# Patient Record
Sex: FEMALE | Employment: FULL TIME | URBAN - METROPOLITAN AREA
[De-identification: names, ages, dates, MRNs, and addresses within clinical notes are randomized per-mention and may not be internally consistent; named-entity substitution may affect disease eponyms.]

---

## 2021-02-11 ENCOUNTER — TRANSFERRED RECORDS (OUTPATIENT)
Dept: HEALTH INFORMATION MANAGEMENT | Facility: CLINIC | Age: 26
End: 2021-02-11

## 2021-03-10 ENCOUNTER — MEDICAL CORRESPONDENCE (OUTPATIENT)
Dept: HEALTH INFORMATION MANAGEMENT | Facility: CLINIC | Age: 26
End: 2021-03-10

## 2021-03-31 ENCOUNTER — TELEPHONE (OUTPATIENT)
Dept: OBGYN | Facility: CLINIC | Age: 26
End: 2021-03-31

## 2021-03-31 NOTE — LETTER
March 31, 2021    Lisa Vicente  [address] [facility] [address]    Lisa,    We are looking forward to meeting you in June.     You are scheduled to see multiple providers on Friday, June 18th, 2021. You will see Anju Miramontes GC at 1:00pm, Dr. Bessie Sanchez at 2:00pm, and Dr. Waters at 3:00pm  at the [facility].    Our address is:    [address] [place] [id number]    The clinic is located on the 3rd flood of the [facility] located on the [place] of the [facility].    You will want to park in the [address]. Take the skyway on the second floor of the ramp to the building and take the elevator to the 3rd floor.    If you have any questions or concerns, please call [phone].    We are looking forward to meeting you!    Ana Cook RN  [facility]  [phone]

## 2021-03-31 NOTE — TELEPHONE ENCOUNTER
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 Called and spoke with patient scheduled in [facility] 6/18/21. Letter with clinic info, address, and appointment date and times. [employer] sign up link sent as well.     Encouraged patient to call with any questions or concerns.